# Patient Record
Sex: FEMALE | URBAN - METROPOLITAN AREA
[De-identification: names, ages, dates, MRNs, and addresses within clinical notes are randomized per-mention and may not be internally consistent; named-entity substitution may affect disease eponyms.]

---

## 2018-02-09 ENCOUNTER — OFFICE VISIT - RIVER FALLS (OUTPATIENT)
Dept: FAMILY MEDICINE | Facility: CLINIC | Age: 68
End: 2018-02-09

## 2018-02-09 ASSESSMENT — MIFFLIN-ST. JEOR: SCORE: 1002.57

## 2022-02-12 VITALS — BODY MASS INDEX: 24.94 KG/M2 | WEIGHT: 127 LBS | HEIGHT: 60 IN

## 2022-02-16 NOTE — PROGRESS NOTES
Patient:   IAIN MULLEN            MRN: [id number]            FIN: [id number]               Age:   67 years     Sex:  Female     :  1950   Associated Diagnoses:   None   Author:   Ayse Huertas      Doctor: Tirso  Patient Information  (Medicare and address information is on file)  Medicare HICN#: _  Address:_ City:_ State:_ Zip:_  Home Phone:_ Work Phone:_ Other Contact Phone:_    Diabetes self-management training (DSMT) and medical nutrition therapy (MNT) are individual and complementary services to improve diabetes care. For Medicare beneficiaries, both services can be ordered in the same year. Research indicates MNT combined with DSMT improves outcomes.    Diabetes Self-Managment Training (DSMT)  Medicare: 10 hours initial DSMT in 12 month period, plus 2 hours follow-up annually  *Check type of training services and number of hours requested:  _ Initial DSMT:  10 hours or _ no. hrs. requested  X Follow-up DSMT: X 2 hours or _ no. hrs. requested  _ Additional insulin training: _ no. hrs. requested    *Patients with special needs requiring individual DSMT  Check all special needs that apply:  _ Vision _ Hearing  _ Physical  _Cognitive Impairment  _ Language limitations X Other  No classes offered    *DSMT Content  X All ten content areas, as appropriate  _ Monitoring diabetes    _ Diabetes as disease process  _ Psychological adjustment _ Physical activity  _ Nutritional management  _ Goal setting, problem solving  _ Medications       _ Prevent, detect and treat acute complications  _ Preconception/pregnancy _ Prevent, detect and teat chronic complications     management or gestational     diabetes management    Medical Nutrition Therapy (MNT)  Medicare: 3 hours initial MNT in the first calendar year, plus two hours follow-up MNT annually. Additional MNT hours available for change in medical condition, treament and/or diagnosis.  *Check the type of MNT and/or number of additional hours requested:  _  Initial MNT:   X Annual follow-up MNT  _ Additional MNT services in the same calendar year, per RD recommendations  _ number of additional hours requested    Please specify change in medical condition, treatment and/or diagnosis      *Diagnosis  Please send recent labs for patient eligibility & outcomes monitoring  _ Type 1 uncontrolled  _ Type 1 controlled  _ Type 2 uncontrolled  X Type 2 controlled  _ Gestational diabetes _ Other _    Complications/Comorbidities  Check all that apply:  X Hypertension   X Dyslipidemia _ Stroke      _ Nephropathy  _ Neuropathy    _ Retinopathy  _ Renal disease   _ CHD  _ PVD       _ Pregnancy  _ Non-healing wound _ Obesity    _ Mental/affective disorder     _ Other _    Current Diabetes Medications  Specify type, dose and frequency  Oral: metformin 500mg i tab BID  Insulin:   Patient now uses: _ Pen _ Needle _ Pump    Patient Behavior Goals/Plan of Care    To gradually lose weight and improve blood sugar control.  Minimize the risk for hypoglycemia and reduce risks of developing complications related to uncontrolled diabetes.    Signature and UPIN #: (UPIN and NPI are on file)  Group/Practice name, address and phone: [facility], [address] [place] [address]  [phone]

## 2022-02-16 NOTE — PROGRESS NOTES
Patient:   IAIN MULLEN            MRN: [id number]            FIN: [id number]               Age:   67 years     Sex:  Female     :  1950   Associated Diagnoses:   Diabetes mellitus, type II; Dyslipidemia   Author:   Ayse Huertas      Visit Information   Visit type:  Diabetes Self Management Education .    Referral source:  Tirso MACIAS, [place], Dr. Galan, [facility] .       Chief Complaint   DM Type II, CAD, Hyperlipidemia       History of Present Illness   Pt has not been to an educator in a number of years.  Hgb a1c 7.7% on 18 = 175 eAG   Discussed nutrition, diabetes, and lifestyle management with pt  Nutrition:  Following a heart healthy diet, pt's carb intake is 60 gm at meals and 20 gm snacks, pt following much older recommendations   Physical Activity:  no routine   Stress:   mod  Sleep: good  Support: family   BG Testing: has not been testing   DM related medication: metformin 500mg BID   Routine diabetes care:  eye exam due, feet checked with MD, dental - good oral cares       Review of Systems            Health Status   Allergies:    Allergic Reactions (Selected)  Severity Not Documented  Aspirin (Wheezing)      Histories   Past Medical History:    No active or resolved past medical history items have been selected or recorded.   Family History:    Diabetes mellitus  Mother ()  Father ()  Hepatitis  Mother ()  MI - Myocardial infarction  Father ()     Procedure history:    Colonoscopy (SNOMED CT 932527369) in  at 60 Years.  Comments:  2018 1:04 PM - Gladys Vázquez.  Bunionectomy (SNOMED CT 05656100) in  at 57 Years.  Hysterectomy (SNOMED CT 202634475).   Social History:             No active social history items have been recorded.      Health Maintenance      Recommendations     Pending (in the next year)        OverDue           Influenza Vaccine due  10/23/13  and every 1  year(s)           Tetanus Vaccine due  16  and every  10  year(s)        Due            Alcohol Misuse Screen (Female) due  02/11/18  and every 1  year(s)           Aspirin Therapy for Prevention of CVD (Female) due  02/11/18  and every 5  year(s)           Breast Cancer Screen due  02/11/18  and every 2  year(s)           Depression Screen (Female) due  02/11/18  and every 1  year(s)           Fall Risk Screen (Female) due  02/11/18  and every 1  year(s)           HIV Screen (if sexually active) (Female) due  02/11/18  and every 1  year(s)           Hepatitis C Screen [id number] (Female) due  02/11/18  One-time only           High Blood Pressure Screen (Female) due  02/11/18  and every 1  year(s)           Lipid Disorders Screen (Female) due  02/11/18  and every 1  year(s)           Lung Cancer Screen (Female) due  02/11/18  and every 1  year(s)           Osteoporosis Screen due  02/11/18  and every 2  year(s)           STD Counseling (if sexually active) (Female) due  02/11/18  and every 1  year(s)           Syphilis Screen (if sexually active) (Female) due  02/11/18  and every 1  year(s)           Tobacco Use Screen (Female) due  02/11/18  and every 1  year(s)           Zoster/Shingles Vaccine due  02/11/18  One-time only        Due In Future            Body Mass Index Check (Female) not due until  02/09/19  and every 1  year(s)     Satisfied (in the past 1 year)        Satisfied            Body Mass Index Check (Female) on  02/09/18.        Impression and Plan   Diagnosis     Diabetes mellitus, type II ([id number]-AI E11.9).     Dyslipidemia ([id number]-DY E78.5).       Counseled: Patient, AADE7 Self Care Behaviors     Healthy Eating - Specific education on carbohydrate counting and discussion on how foods affect BG readings, reading food labels, appropriate portion sizes, well balanced meals, diabetic plate method as a general rule.  Follow Therapeutic Lifestyle Changes (TLC) nutrition plan for heart healthy eating.    Patient is provided with ideas on how to incorporate  dietary recommendations, meal planning and preperation, portion control and mindful eating.    Being Active - Education on how exercise helps with :    - lowering BG by increasing the muscles ability to take up and use glucose   - weight loss   - healthier heart (improve lipid profile)   - improve sleep, mood, energy   - decrease stress   Monitoring - Pt is instructed on recommended testing schedule and blood glucose target levels.    Taking Medication - will have pt start testing pre/ 2 hr pp and then evaluate next A1C to determine if increase in medication recommended  Problem Solving - medical support team assistance, resources provided (written and internet); good control of stress  Healthy Coping - support of friends, family, and medical support team   Reducing Risks - Recommend r[facility] dental [facility], adequate sleep, importance of controlling BG to prevent developing complications related to uncontrolled DM including nephropathy, neuropathy, retinopathy, and cardiovascular disease.  Discussed importance of proper skin, dental, foot and eye care.  Weight loss goal of 7% of current body weight pounds as a reasonable goal to help increase insulin sensitivity       Goals:   1.  Practice healthy stress management and get good quality sleep with the goal of 7-8 hours per night.    2.  Increase physical activity and make this a part of a daily routine.  Recommend 30 minutes of physical activity 5 or more days per week.    3.  Eat in a healthy way, per diabetic plate method.  Nutrition reference: Eat 3 meals a day; snacks are optional.  A meal is three or more food groups; make it colorful for better nutrition.  Incorporate TLC dietary heart healthy guidelines.    4.  Total Carbohydrate intake 30 grams for meals, snacks (optional) < 15 grams  5.  Pt to monitor BG 3 days per week and twice on those days.  BG goals: Fasting and before meals 80 - 120 mg/dL, 2 hrs after the start of a meal 80 - 140 mg/dL.    6.   Goal weight 118# by 9/2018  7.  Read handouts provided.       Professional Services   Time spent with pt 60 min   cc Dr. Chahal   cc Dr. Hernandez